# Patient Record
Sex: MALE | Race: AMERICAN INDIAN OR ALASKA NATIVE | URBAN - METROPOLITAN AREA
[De-identification: names, ages, dates, MRNs, and addresses within clinical notes are randomized per-mention and may not be internally consistent; named-entity substitution may affect disease eponyms.]

---

## 2023-03-29 ENCOUNTER — OFFICE VISIT (OUTPATIENT)
Facility: LOCATION | Age: 43
End: 2023-03-29
Payer: MEDICARE

## 2023-03-29 DIAGNOSIS — H35.52 RETINITIS PIGMENTOSA: Primary | ICD-10-CM

## 2023-03-29 DIAGNOSIS — H50.10 EXOTROPIA: ICD-10-CM

## 2023-03-29 DIAGNOSIS — H54.8 LEGAL BLINDNESS, AS DEFINED IN USA: ICD-10-CM

## 2023-03-29 DIAGNOSIS — Z96.1 PRESENCE OF INTRAOCULAR LENS: ICD-10-CM

## 2023-03-29 PROCEDURE — 99204 OFFICE O/P NEW MOD 45 MIN: CPT | Performed by: OPHTHALMOLOGY

## 2023-03-29 PROCEDURE — 92134 CPTRZ OPH DX IMG PST SGM RTA: CPT | Performed by: OPHTHALMOLOGY

## 2023-03-29 ASSESSMENT — INTRAOCULAR PRESSURE
OS: 17
OD: 19

## 2023-03-29 NOTE — IMPRESSION/PLAN
Impression: Exotropia: H50.10. Plan: Likely sensory XT. Ok to follow up with [facility] to establish whether prism is helpful.  Can defer that decision to whether there is subjective improvement or not

## 2023-03-29 NOTE — IMPRESSION/PLAN
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 Plan: Longstanding diagnosis. Will set up for genetic testing and will call patient with the results. No CME. He is already s/p CE/PCIOL.  

RTC 12 mo with OCT OU